# Patient Record
Sex: MALE | HISPANIC OR LATINO | Employment: FULL TIME | URBAN - METROPOLITAN AREA
[De-identification: names, ages, dates, MRNs, and addresses within clinical notes are randomized per-mention and may not be internally consistent; named-entity substitution may affect disease eponyms.]

---

## 2021-06-30 ENCOUNTER — COMMUNICATION - HEALTHEAST (OUTPATIENT)
Dept: SCHEDULING | Facility: CLINIC | Age: 41
End: 2021-06-30

## 2021-07-05 DIAGNOSIS — N20.1 URETERAL STONE: Primary | ICD-10-CM

## 2021-08-05 ENCOUNTER — HOSPITAL ENCOUNTER (OUTPATIENT)
Dept: CT IMAGING | Facility: HOSPITAL | Age: 41
Discharge: HOME | End: 2021-08-05
Attending: PHYSICIAN ASSISTANT | Admitting: PHYSICIAN ASSISTANT

## 2021-08-05 DIAGNOSIS — N20.1 URETERAL STONE: ICD-10-CM

## 2021-08-05 PROCEDURE — 74176 CT ABD & PELVIS W/O CONTRAST: CPT

## 2021-08-05 PROCEDURE — 82365 CALCULUS SPECTROSCOPY: CPT

## 2021-08-06 ENCOUNTER — LAB (OUTPATIENT)
Dept: LAB | Facility: HOSPITAL | Age: 41
End: 2021-08-06

## 2021-08-06 DIAGNOSIS — N20.1 CALCULUS OF URETER: ICD-10-CM

## 2021-08-10 LAB
APPEARANCE STONE: NORMAL
COMPN STONE: NORMAL
NUMBER STONE: 2
SIZE STONE: NORMAL MM
SPECIMEN WT: 17 MG

## 2021-08-12 ENCOUNTER — VIRTUAL VISIT (OUTPATIENT)
Dept: UROLOGY | Facility: CLINIC | Age: 41
End: 2021-08-12

## 2021-08-12 ENCOUNTER — ALLIED HEALTH/NURSE VISIT (OUTPATIENT)
Dept: INTERPRETER SERVICES | Facility: CLINIC | Age: 41
End: 2021-08-12

## 2021-08-12 DIAGNOSIS — N20.0 CALCULUS OF KIDNEY: Primary | ICD-10-CM

## 2021-08-12 PROCEDURE — 99213 OFFICE O/P EST LOW 20 MIN: CPT | Mod: 95 | Performed by: PHYSICIAN ASSISTANT

## 2021-08-12 RX ORDER — OXYCODONE HYDROCHLORIDE 5 MG/1
TABLET ORAL
COMMUNITY
Start: 2021-06-29

## 2021-08-12 ASSESSMENT — PAIN SCALES - GENERAL: PAINLEVEL: MILD PAIN (3)

## 2021-08-12 NOTE — PROGRESS NOTES
Assessment/Plan:    Assessment & Plan   David was seen today for follow up.    Diagnoses and all orders for this visit:    Calculus of kidney  -ionized calcium, PTH and LITHOLINK x 2    Stone Management Plan  Stone Management 7/2/2021   Urinary Tract Infection No suspicion of infection   Renal Colic Well controlled symptoms   Renal Failure No suspicion of renal failure   Current CT date 6/29/2021   Right sided stones? Yes   R Number of ureteral stones 1   R GSD of ureteral stones 4   R Location of ureteral stone Distal   R Number of kidney stones  1   R GSD of kidney stones < 2   R Hydronephrosis Mild   R Stone Event New event   Diagnosis date 6/29/2021   Initial location of primary symptomatic stone Distal   Initial GSD of primary symptomatic stone 4   R MET status Initiation   R Current Plan MET   MET 2 week F/U   Left sided stones? Yes   L Number of ureteral stones No ureteral stones   L Number of kidney stones  1   L GSD of kidney stones < 2   L Hydronephrosis None   L Stone Event No current event   L Current Plan Observe   Observe rationale Limited stone burden with good prognosis for spontaneous passage     PLAN    39 yo Citizen of [place] speaking M first time stone former with interval passage of obstructing right UVJ stone. Small, nonobstructing, bilateral renal stones.    He is congratulated on passing his stone and will proceed with stone risk evaluation Serum stone risk chemistries including parathyroid hormone and ionized calcium will be obtained before next visit. Two 24 hour urine collections and dietary journal will be obtained at earliest covenience.    Phone call duration: 11 minutes  15 minutes spent on the date of the encounter doing chart review, history and exam, documentation and further activities per the note    POLA Bruner [facility]    Subjective:     HPI  Mr. Jorge A Reyes Chacon is a 40 year old Citizen of [place] male who is being evaluated via a billable  telephone visit by [facility] [place] for medical expulsive therapy follow up.     On last encounter, his 4 mm stone was in right distal ureter with Mild hydronephrosis. He has had no unanticipated events.    He passed the stone and has analysis results to discuss. He is asymptomatic at present. He denies symptoms of fever, chills, flank pain, nausea, vomiting, urinary frequency and dysuria.     Imaging was not performed today because he has passed stone and captured for analysis.     Stone analysis from 21 is 70% CaOx and 30% CaP (apatite).     ROS   Review of systems is negative except for HPI.    No past medical history on file.    No past surgical history on file.    No current outpatient medications on file.     Not on File    Social History     Socioeconomic History     Marital status:      Spouse name: Not on file     Number of children: Not on file     Years of education: Not on file     Highest education level: Not on file   Occupational History     Not on file   Tobacco Use     Smoking status: Former Smoker     Quit date: 2009     Years since quittin.1     Smokeless tobacco: Never Used   Substance and Sexual Activity     Alcohol use: Yes     Comment: Alcoholic Drinks/day: beer      Drug use: Never     Sexual activity: Not on file   Other Topics Concern     Not on file   Social History Narrative     Not on file     Social Determinants of Health     Financial Resource Strain:      Difficulty of Paying Living Expenses:    Food Insecurity:      Worried About Running Out of Food in the Last Year:      Ran Out of Food in the Last Year:    Transportation Needs:      Lack of Transportation (Medical):      Lack of Transportation (Non-Medical):    Physical Activity:      Days of Exercise per Week:      Minutes of Exercise per Session:    Stress:      Feeling of Stress :    Social Connections:      Frequency of Communication with Friends and Family:      Frequency of Social  Gatherings with Friends and Family:      Attends Jainism Services:      Active Member of Clubs or Organizations:      Attends Club or Organization Meetings:      Marital Status:    Intimate Partner Violence:      Fear of Current or Ex-Partner:      Emotionally Abused:      Physically Abused:      Sexually Abused:      Family History   Problem Relation Age of Onset     Kidney Disease Sister        Objective:     No vitals or physical exam obtained due to virtual visit

## 2021-08-12 NOTE — PROGRESS NOTES
Patient is roomed via telephone for a virtual visit with the help of a .  Patient confirmed he is in the [facility].  Patient understands that this virtual visit is billable and agree to proceed with appointment.

## 2021-08-12 NOTE — PATIENT INSTRUCTIONS
Proceed with stone risk workup:    Bloodwork and LITHOLINK x 2   Patient Stated Goal: Prevent further stones

## 2021-08-16 ENCOUNTER — TELEPHONE (OUTPATIENT)
Dept: UROLOGY | Facility: CLINIC | Age: 41
End: 2021-08-16

## 2021-08-16 NOTE — TELEPHONE ENCOUNTER
Request faxed to [facility] to send patient 2 24hour testing kits.  Cecy Peñaloza RN

## 2021-09-03 ENCOUNTER — LAB (OUTPATIENT)
Dept: LAB | Facility: HOSPITAL | Age: 41
End: 2021-09-03

## 2021-09-03 DIAGNOSIS — N20.0 CALCULUS OF KIDNEY: ICD-10-CM

## 2021-09-03 LAB
CALCIUM, IONIZED MEASURED: 1.13 MMOL/L (ref 1.11–1.3)
ION CA PH 7.4: 1.11 MMOL/L (ref 1.11–1.3)
PH: 7.37 (ref 7.35–7.45)
PTH-INTACT SERPL-MCNC: 121 PG/ML (ref 10–86)

## 2021-09-03 PROCEDURE — 36415 COLL VENOUS BLD VENIPUNCTURE: CPT

## 2021-09-03 PROCEDURE — 82330 ASSAY OF CALCIUM: CPT

## 2021-09-03 PROCEDURE — 83970 ASSAY OF PARATHORMONE: CPT

## 2022-03-15 ENCOUNTER — TRANSFERRED RECORDS (OUTPATIENT)
Dept: HEALTH INFORMATION MANAGEMENT | Facility: CLINIC | Age: 42
End: 2022-03-15

## 2022-03-15 ENCOUNTER — MEDICAL CORRESPONDENCE (OUTPATIENT)
Dept: HEALTH INFORMATION MANAGEMENT | Facility: CLINIC | Age: 42
End: 2022-03-15

## 2022-04-22 ENCOUNTER — TRANSCRIBE ORDERS (OUTPATIENT)
Dept: GASTROENTEROLOGY | Facility: CLINIC | Age: 42
End: 2022-04-22

## 2022-04-22 DIAGNOSIS — K64.8 BLEEDING INTERNAL HEMORRHOIDS: Primary | ICD-10-CM

## 2022-04-22 NOTE — TELEPHONE ENCOUNTER
Duplicate MRN:  [id number]  Diagnosis, Referred by & from: Bleeding Internal Hemorrhoids   Appt date: 6/28/2022   NOTES STATUS DETAILS   OFFICE NOTE from referring provider Received Bill - [place]:  3/15/22 - PCC OV with Dr. Hernandez   OFFICE NOTE from other specialist Internal [facility]:  8/12/21 - URO OV with JACEK Bruner (in duplicate chart)   DISCHARGE SUMMARY from hospital N/A    DISCHARGE REPORT from the ER Internal [facility]:  6/29/21 - ED OV with JACEK Dumont  (in duplicate chart)    OPERATIVE REPORT N/A    MEDICATION LIST Received    LABS N/A    DIAGNOSTIC PROCEDURES N/A    IMAGING (DISC & REPORT)      CT Internal [facility]:  8/5/21 - CT Abd/Pelvis (in duplicate chart)  6/29/21 - CT Abd/Pelvis (in duplicate chart)     Records Requested  04/22/22    Facility  [facility]  Fax: [phone]   Outcome * 4/22/22 3:02 PM Faxed req to [facility] for records to be faxed to the clinic. - Sara    * 5/13/22 8:09 AM Faxed 2nd urg req to [facility] for records to be faxed to the clinic. - Sara    * 6/3/22 8:26 AM Records from Bill scanned into the chart. - Sara

## 2022-06-28 ENCOUNTER — PRE VISIT (OUTPATIENT)
Dept: SURGERY | Facility: CLINIC | Age: 42
End: 2022-06-28